# Patient Record
Sex: FEMALE
[De-identification: names, ages, dates, MRNs, and addresses within clinical notes are randomized per-mention and may not be internally consistent; named-entity substitution may affect disease eponyms.]

---

## 2023-06-02 ENCOUNTER — APPOINTMENT (OUTPATIENT)
Dept: ORTHOPEDIC SURGERY | Facility: CLINIC | Age: 28
End: 2023-06-02

## 2023-06-02 PROBLEM — Z00.00 ENCOUNTER FOR PREVENTIVE HEALTH EXAMINATION: Status: ACTIVE | Noted: 2023-06-02

## 2023-06-06 ENCOUNTER — APPOINTMENT (OUTPATIENT)
Dept: ORTHOPEDIC SURGERY | Facility: CLINIC | Age: 28
End: 2023-06-06
Payer: MEDICAID

## 2023-06-06 DIAGNOSIS — M25.362 OTHER INSTABILITY, LEFT KNEE: ICD-10-CM

## 2023-06-06 PROCEDURE — 99203 OFFICE O/P NEW LOW 30 MIN: CPT

## 2023-06-06 PROCEDURE — 73560 X-RAY EXAM OF KNEE 1 OR 2: CPT | Mod: 50

## 2023-06-06 NOTE — ASSESSMENT
[FreeTextEntry1] :   28-year-old woman with left ACL reconstruction surgery 4 months ago.  At the very least I think some of her feelings of less spring achiness related to her injury but some of them are related to the need for proper rehabilitation.  I would like to get physical therapy focusing on hamstring strengthening exercises agility training and generalized knee strengthening exercises.  Modalities could be utilized adjunct therapy but the focus of therapy should teach the patient a self-directed exercise program of balance knee strengthening and with the focus on hamstring strengthening.  Subsequently I would have her follow up with Dr. Carlos Fernandes or Dr. Florentino Ayers with regard to recommendations if she still has symptoms of instability.  As a final note physical examination upper extremity suggests that she is generalized ligament laxity with hyperextension of her elbows and of her wrist and metacarpocarpal joints .

## 2023-06-06 NOTE — IMAGING
[de-identified] :   Pleasant young woman sits comfortably in my office in no distress.  \par \par Physical examination: \par Bilateral knees:  Well-healed surgical scars consistent with her history.  Decreased sensation about transverse branches saphenous nerves adjacent to the hamstring donor sites.  No surgical site demonstrates any undue tenderness erythema induration or fluctuance.  Lachman test bilaterally demonstrates a clear endpoint with a more distinct endpoint on the contralateral right side.  Negative pivot shift on left knee.  No varus valgus instability.  Patellofemoral tracking is normal.  Hamstring strength bilaterally is diminished.  Quadriceps strength excellent.  Patellofemoral tracking normal.  \par \par Radiographs:\par Bilateral weight-bearing knees (AP, lateral):  Left knee demonstrates stigmata of endo button and hamstring ACL reconstructive surgery.  Joint spaces are well preserved with no evidence arthritic changes about either knee.

## 2023-06-06 NOTE — HISTORY OF PRESENT ILLNESS
[de-identified] :  28-year-old woman presents this office for evaluation of left knee instability.  She is an extensive orthopedic history.  In 2015 underwent a left knee ACL reconstruction with hamstring graft from the ipsilateral lower extremity.  Despite intense self-directed exercise she has noted persistent feeling of decreased “spring achiness” when running on the left side.  Was seen by her orthopedist in [place] who suggested that her prior hamstring ACL reconstruction had stretched out and 4 months ago underwent a revision left ACL reconstruction with ACL graft drive from hamstrings taken from the contralateral side.  She is decreased sensation adjacent to donor sites of her hamstrings.  She denies any fevers or drainage.  Feels that the right side hamstring is week and a left currently.  Would like to get started physical therapy.  Has several questions about her surgery.